# Patient Record
Sex: FEMALE | Race: WHITE | HISPANIC OR LATINO | Employment: PART TIME | URBAN - METROPOLITAN AREA
[De-identification: names, ages, dates, MRNs, and addresses within clinical notes are randomized per-mention and may not be internally consistent; named-entity substitution may affect disease eponyms.]

---

## 2025-03-11 ENCOUNTER — APPOINTMENT (OUTPATIENT)
Dept: RADIOLOGY | Facility: HOSPITAL | Age: 30
End: 2025-03-11
Payer: COMMERCIAL

## 2025-03-11 ENCOUNTER — APPOINTMENT (OUTPATIENT)
Dept: CARDIOLOGY | Facility: HOSPITAL | Age: 30
End: 2025-03-11
Payer: COMMERCIAL

## 2025-03-11 ENCOUNTER — HOSPITAL ENCOUNTER (EMERGENCY)
Facility: HOSPITAL | Age: 30
Discharge: HOME | End: 2025-03-11
Payer: COMMERCIAL

## 2025-03-11 VITALS
TEMPERATURE: 97 F | BODY MASS INDEX: 28.77 KG/M2 | WEIGHT: 179 LBS | OXYGEN SATURATION: 98 % | HEART RATE: 72 BPM | SYSTOLIC BLOOD PRESSURE: 122 MMHG | HEIGHT: 66 IN | DIASTOLIC BLOOD PRESSURE: 68 MMHG | RESPIRATION RATE: 18 BRPM

## 2025-03-11 DIAGNOSIS — R10.9 ABDOMINAL PAIN, UNSPECIFIED ABDOMINAL LOCATION: Primary | ICD-10-CM

## 2025-03-11 DIAGNOSIS — R11.2 NAUSEA AND VOMITING, UNSPECIFIED VOMITING TYPE: ICD-10-CM

## 2025-03-11 LAB
ALBUMIN SERPL BCP-MCNC: 4.4 G/DL (ref 3.4–5)
ALP SERPL-CCNC: 94 U/L (ref 33–110)
ALT SERPL W P-5'-P-CCNC: 17 U/L (ref 7–45)
ANION GAP SERPL CALC-SCNC: 16 MMOL/L (ref 10–20)
APPEARANCE UR: ABNORMAL
AST SERPL W P-5'-P-CCNC: 17 U/L (ref 9–39)
BASOPHILS # BLD AUTO: 0.06 X10*3/UL (ref 0–0.1)
BASOPHILS NFR BLD AUTO: 0.6 %
BILIRUB SERPL-MCNC: 0.6 MG/DL (ref 0–1.2)
BILIRUB UR STRIP.AUTO-MCNC: NEGATIVE MG/DL
BUN SERPL-MCNC: 5 MG/DL (ref 6–23)
CALCIUM SERPL-MCNC: 9.5 MG/DL (ref 8.6–10.3)
CARDIAC TROPONIN I PNL SERPL HS: <3 NG/L (ref 0–13)
CHLORIDE SERPL-SCNC: 105 MMOL/L (ref 98–107)
CO2 SERPL-SCNC: 23 MMOL/L (ref 21–32)
COLOR UR: YELLOW
CREAT SERPL-MCNC: 0.87 MG/DL (ref 0.5–1.05)
EGFRCR SERPLBLD CKD-EPI 2021: >90 ML/MIN/1.73M*2
EOSINOPHIL # BLD AUTO: 0.1 X10*3/UL (ref 0–0.7)
EOSINOPHIL NFR BLD AUTO: 1.1 %
ERYTHROCYTE [DISTWIDTH] IN BLOOD BY AUTOMATED COUNT: 15.5 % (ref 11.5–14.5)
FLUAV RNA RESP QL NAA+PROBE: NOT DETECTED
FLUBV RNA RESP QL NAA+PROBE: NOT DETECTED
GLUCOSE SERPL-MCNC: 90 MG/DL (ref 74–99)
GLUCOSE UR STRIP.AUTO-MCNC: NORMAL MG/DL
HCG UR QL IA.RAPID: NEGATIVE
HCT VFR BLD AUTO: 43.7 % (ref 36–46)
HGB BLD-MCNC: 14.2 G/DL (ref 12–16)
IMM GRANULOCYTES # BLD AUTO: 0.03 X10*3/UL (ref 0–0.7)
IMM GRANULOCYTES NFR BLD AUTO: 0.3 % (ref 0–0.9)
KETONES UR STRIP.AUTO-MCNC: ABNORMAL MG/DL
LEUKOCYTE ESTERASE UR QL STRIP.AUTO: ABNORMAL
LIPASE SERPL-CCNC: 25 U/L (ref 9–82)
LYMPHOCYTES # BLD AUTO: 2.18 X10*3/UL (ref 1.2–4.8)
LYMPHOCYTES NFR BLD AUTO: 23.2 %
MCH RBC QN AUTO: 26.3 PG (ref 26–34)
MCHC RBC AUTO-ENTMCNC: 32.5 G/DL (ref 32–36)
MCV RBC AUTO: 81 FL (ref 80–100)
MONOCYTES # BLD AUTO: 1.09 X10*3/UL (ref 0.1–1)
MONOCYTES NFR BLD AUTO: 11.6 %
NEUTROPHILS # BLD AUTO: 5.92 X10*3/UL (ref 1.2–7.7)
NEUTROPHILS NFR BLD AUTO: 63.2 %
NITRITE UR QL STRIP.AUTO: NEGATIVE
NRBC BLD-RTO: 0 /100 WBCS (ref 0–0)
PH UR STRIP.AUTO: 6.5 [PH]
PLATELET # BLD AUTO: 362 X10*3/UL (ref 150–450)
POTASSIUM SERPL-SCNC: 3.4 MMOL/L (ref 3.5–5.3)
PROT SERPL-MCNC: 7.5 G/DL (ref 6.4–8.2)
PROT UR STRIP.AUTO-MCNC: ABNORMAL MG/DL
RBC # BLD AUTO: 5.4 X10*6/UL (ref 4–5.2)
RBC # UR STRIP.AUTO: NEGATIVE MG/DL
RBC #/AREA URNS AUTO: ABNORMAL /HPF
RSV RNA RESP QL NAA+PROBE: NOT DETECTED
SARS-COV-2 RNA RESP QL NAA+PROBE: NOT DETECTED
SODIUM SERPL-SCNC: 141 MMOL/L (ref 136–145)
SP GR UR STRIP.AUTO: 1.03
UROBILINOGEN UR STRIP.AUTO-MCNC: ABNORMAL MG/DL
WBC # BLD AUTO: 9.4 X10*3/UL (ref 4.4–11.3)
WBC #/AREA URNS AUTO: ABNORMAL /HPF

## 2025-03-11 PROCEDURE — 96374 THER/PROPH/DIAG INJ IV PUSH: CPT | Mod: 59 | Performed by: PHYSICIAN ASSISTANT

## 2025-03-11 PROCEDURE — 71046 X-RAY EXAM CHEST 2 VIEWS: CPT | Mod: FY

## 2025-03-11 PROCEDURE — 83690 ASSAY OF LIPASE: CPT | Performed by: PHYSICIAN ASSISTANT

## 2025-03-11 PROCEDURE — 85025 COMPLETE CBC W/AUTO DIFF WBC: CPT | Performed by: PHYSICIAN ASSISTANT

## 2025-03-11 PROCEDURE — 80053 COMPREHEN METABOLIC PANEL: CPT | Performed by: PHYSICIAN ASSISTANT

## 2025-03-11 PROCEDURE — 2500000004 HC RX 250 GENERAL PHARMACY W/ HCPCS (ALT 636 FOR OP/ED): Performed by: PHYSICIAN ASSISTANT

## 2025-03-11 PROCEDURE — 96375 TX/PRO/DX INJ NEW DRUG ADDON: CPT | Performed by: PHYSICIAN ASSISTANT

## 2025-03-11 PROCEDURE — 81025 URINE PREGNANCY TEST: CPT | Performed by: PHYSICIAN ASSISTANT

## 2025-03-11 PROCEDURE — 36415 COLL VENOUS BLD VENIPUNCTURE: CPT | Performed by: PHYSICIAN ASSISTANT

## 2025-03-11 PROCEDURE — 74177 CT ABD & PELVIS W/CONTRAST: CPT

## 2025-03-11 PROCEDURE — 93005 ELECTROCARDIOGRAM TRACING: CPT

## 2025-03-11 PROCEDURE — 2500000001 HC RX 250 WO HCPCS SELF ADMINISTERED DRUGS (ALT 637 FOR MEDICARE OP): Performed by: PHYSICIAN ASSISTANT

## 2025-03-11 PROCEDURE — 2550000001 HC RX 255 CONTRASTS: Performed by: PHYSICIAN ASSISTANT

## 2025-03-11 PROCEDURE — 76856 US EXAM PELVIC COMPLETE: CPT

## 2025-03-11 PROCEDURE — 81001 URINALYSIS AUTO W/SCOPE: CPT | Performed by: PHYSICIAN ASSISTANT

## 2025-03-11 PROCEDURE — 96361 HYDRATE IV INFUSION ADD-ON: CPT | Performed by: PHYSICIAN ASSISTANT

## 2025-03-11 PROCEDURE — 87637 SARSCOV2&INF A&B&RSV AMP PRB: CPT | Performed by: PHYSICIAN ASSISTANT

## 2025-03-11 PROCEDURE — 71046 X-RAY EXAM CHEST 2 VIEWS: CPT | Mod: COMPUTED RADIOGRAPHY X-RAY | Performed by: RADIOLOGY

## 2025-03-11 PROCEDURE — 87086 URINE CULTURE/COLONY COUNT: CPT | Mod: STJLAB | Performed by: PHYSICIAN ASSISTANT

## 2025-03-11 PROCEDURE — 74177 CT ABD & PELVIS W/CONTRAST: CPT | Performed by: RADIOLOGY

## 2025-03-11 PROCEDURE — 96376 TX/PRO/DX INJ SAME DRUG ADON: CPT | Performed by: PHYSICIAN ASSISTANT

## 2025-03-11 PROCEDURE — 99285 EMERGENCY DEPT VISIT HI MDM: CPT | Mod: 25

## 2025-03-11 PROCEDURE — 84484 ASSAY OF TROPONIN QUANT: CPT | Performed by: PHYSICIAN ASSISTANT

## 2025-03-11 RX ORDER — ONDANSETRON HYDROCHLORIDE 2 MG/ML
4 INJECTION, SOLUTION INTRAVENOUS ONCE
Status: COMPLETED | OUTPATIENT
Start: 2025-03-11 | End: 2025-03-11

## 2025-03-11 RX ORDER — ACETAMINOPHEN 325 MG/1
975 TABLET ORAL ONCE
Status: COMPLETED | OUTPATIENT
Start: 2025-03-11 | End: 2025-03-11

## 2025-03-11 RX ORDER — ONDANSETRON 4 MG/1
4 TABLET, ORALLY DISINTEGRATING ORAL EVERY 8 HOURS PRN
Qty: 9 TABLET | Refills: 0 | Status: SHIPPED | OUTPATIENT
Start: 2025-03-11 | End: 2025-03-14

## 2025-03-11 RX ORDER — KETOROLAC TROMETHAMINE 15 MG/ML
15 INJECTION, SOLUTION INTRAMUSCULAR; INTRAVENOUS ONCE
Status: COMPLETED | OUTPATIENT
Start: 2025-03-11 | End: 2025-03-11

## 2025-03-11 RX ORDER — DICYCLOMINE HYDROCHLORIDE 10 MG/1
20 CAPSULE ORAL ONCE
Status: DISCONTINUED | OUTPATIENT
Start: 2025-03-11 | End: 2025-03-11 | Stop reason: HOSPADM

## 2025-03-11 RX ORDER — DICYCLOMINE HYDROCHLORIDE 20 MG/1
20 TABLET ORAL 3 TIMES DAILY
Qty: 15 TABLET | Refills: 0 | Status: SHIPPED | OUTPATIENT
Start: 2025-03-11 | End: 2025-03-16

## 2025-03-11 RX ADMIN — IOHEXOL 75 ML: 350 INJECTION, SOLUTION INTRAVENOUS at 13:52

## 2025-03-11 RX ADMIN — ACETAMINOPHEN 975 MG: 325 TABLET ORAL at 12:18

## 2025-03-11 RX ADMIN — ONDANSETRON 4 MG: 2 INJECTION INTRAMUSCULAR; INTRAVENOUS at 15:00

## 2025-03-11 RX ADMIN — SODIUM CHLORIDE, POTASSIUM CHLORIDE, SODIUM LACTATE AND CALCIUM CHLORIDE 1000 ML: 600; 310; 30; 20 INJECTION, SOLUTION INTRAVENOUS at 12:20

## 2025-03-11 RX ADMIN — ONDANSETRON 4 MG: 2 INJECTION INTRAMUSCULAR; INTRAVENOUS at 12:18

## 2025-03-11 RX ADMIN — KETOROLAC TROMETHAMINE 15 MG: 15 INJECTION, SOLUTION INTRAMUSCULAR; INTRAVENOUS at 13:06

## 2025-03-11 ASSESSMENT — COLUMBIA-SUICIDE SEVERITY RATING SCALE - C-SSRS
1. IN THE PAST MONTH, HAVE YOU WISHED YOU WERE DEAD OR WISHED YOU COULD GO TO SLEEP AND NOT WAKE UP?: NO
2. HAVE YOU ACTUALLY HAD ANY THOUGHTS OF KILLING YOURSELF?: NO
6. HAVE YOU EVER DONE ANYTHING, STARTED TO DO ANYTHING, OR PREPARED TO DO ANYTHING TO END YOUR LIFE?: NO

## 2025-03-11 ASSESSMENT — PAIN SCALES - GENERAL
PAINLEVEL_OUTOF10: 5 - MODERATE PAIN
PAINLEVEL_OUTOF10: 4

## 2025-03-11 ASSESSMENT — LIFESTYLE VARIABLES
EVER FELT BAD OR GUILTY ABOUT YOUR DRINKING: NO
HAVE YOU EVER FELT YOU SHOULD CUT DOWN ON YOUR DRINKING: NO
EVER HAD A DRINK FIRST THING IN THE MORNING TO STEADY YOUR NERVES TO GET RID OF A HANGOVER: NO
HAVE PEOPLE ANNOYED YOU BY CRITICIZING YOUR DRINKING: NO
TOTAL SCORE: 0

## 2025-03-11 ASSESSMENT — PAIN - FUNCTIONAL ASSESSMENT: PAIN_FUNCTIONAL_ASSESSMENT: 0-10

## 2025-03-11 ASSESSMENT — PAIN DESCRIPTION - DESCRIPTORS: DESCRIPTORS: CRAMPING

## 2025-03-11 ASSESSMENT — PAIN DESCRIPTION - LOCATION
LOCATION: ABDOMEN
LOCATION: ABDOMEN

## 2025-03-11 NOTE — DISCHARGE INSTRUCTIONS
Recommend starting daily MiraLAX or Metamucil.    Please return to the ER or seek immediate medical attention if you experience new or worsening abdominal pain, persistent vomiting, persistent diarrhea, black tar stools, fever of 38C (100.4) or higher, chest pain, shortness of breath, or worsening of your current symptoms.    You are welcome back any time. Thank you for entrusting your care to us, I hope we made your visit as pleasant as possible. Wishing you well!    Amanda Torres PA-C

## 2025-03-11 NOTE — Clinical Note
Xiomara Lynn was seen and treated in our emergency department on 3/11/2025.  She may return to work on 03/13/2025.       If you have any questions or concerns, please don't hesitate to call.      Amanda Torres PA-C

## 2025-03-11 NOTE — ED PROVIDER NOTES
Emergency Department Provider Note        History of Present Illness     History provided by: Patient and Parent  Limitations to History: None  External Records Reviewed with Brief Summary:  pmhx    HPI:  Xiomara Lynn is a 29 y.o. female who presents today for evaluation of abdominal pain nausea vomiting and diarrhea.  Patient states yesterday she started feeling sick, states that she started developing body aches, cough as well as nausea vomiting.  Patient states she has had about 10 episodes of nonbloody vomiting, states she is still having some chest pain that is worsened with vomiting.  Denies hematemesis.  She denies any fevers but does endorse feeling chilled since yesterday as well.  She believes she may have come down with the flu.  Patient states he is having some lower abdominal pain, states it migrates from the left side to the right side, she has had a previous benign liver tumor removal and they removed her gallbladder at the same time, otherwise denies abdominal surgeries.  She denies burning frequency urgency with urination, denies any vaginal discharge or concern for STDs.  She does endorse that she has been constipated and yesterday did an enema, states that she was able to get diarrheal stool out but she still feels like there is some in there.  She states she has had similar pain with constipation in the past.  Currently rates pain as 5 out of 10 in severity.    Physical Exam   Triage vitals:  T 36.1 °C (97 °F)  HR 86  BP (!) 137/100  RR 19  O2 99 % None (Room air)    Physical Exam  Vitals and nursing note reviewed.   Constitutional:       General: She is not in acute distress.     Appearance: Normal appearance. She is not toxic-appearing.   HENT:      Head: Normocephalic and atraumatic.      Nose: Nose normal.      Mouth/Throat:      Mouth: Mucous membranes are moist.   Eyes:      Extraocular Movements: Extraocular movements intact.   Cardiovascular:      Rate and Rhythm: Normal rate and  regular rhythm.   Pulmonary:      Effort: Pulmonary effort is normal.      Breath sounds: Normal breath sounds.   Chest:      Comments: No chest wall tenderness, no crepitus along the chest or neck area.  Abdominal:      Palpations: Abdomen is soft.      Tenderness: There is abdominal tenderness in the right lower quadrant, suprapubic area and left lower quadrant. There is right CVA tenderness. There is no rebound. Positive signs include McBurney's sign. Negative signs include Rovsing's sign, psoas sign and obturator sign.   Musculoskeletal:         General: Normal range of motion.      Cervical back: Normal range of motion and neck supple.   Skin:     General: Skin is warm and dry.   Neurological:      General: No focal deficit present.      Mental Status: She is alert.   Psychiatric:         Mood and Affect: Mood normal.         Thought Content: Thought content normal.        US PELVIS TRANSABDOMINAL WITH TRANSVAGINAL   Final Result   The endometrium is of normal thickness.        5 x 2 x 2 mm echogenic focus within the cervix the expected course of   the endometrium could reflect calcification or small polyp. The CT   findings of rounded hypodense endometrial lesion is not confirmed   with ultrasound. The CT findings could be related to some hemorrhage   in the endometrium or redundancy the cervix.        Normal right ovary.        Nonvisualization left ovary.             MACRO:   None        Signed by: Gil Celis 3/11/2025 4:31 PM   Dictation workstation:   [id number]      CT abdomen pelvis w IV contrast   Final Result   Status post partial hepatectomy and cholecystectomy.        3 mm nonobstructing calculus in lower pole the right kidney.        There is no evidence of appendicitis. The terminal ileum is within   normal limits.        Rounded area of heterogenous intermediate to low densities identified   in the lower uterine segment, possibly in the endometrial cavity.   This could be related to  blood product though underlying   space-occupying mass is to be excluded. Further workup with dedicated   pelvic ultrasound is recommended.             MACRO:   None        Signed by: Gil Celis 3/11/2025 2:15 PM   Dictation workstation:   [id number]      XR chest 2 views   Final Result   1.  No evidence of acute cardiopulmonary process.   2. No evidence of subdiaphragmatic free air.                  MACRO:   None.        Signed by: Donald Potter 3/11/2025 12:42 PM   Dictation workstation:   [id number]        Abnormal Labs Reviewed   CBC WITH AUTO DIFFERENTIAL - Abnormal; Notable for the following components:       Result Value    RBC 5.40 (*)     RDW 15.5 (*)     Monocytes Absolute 1.09 (*)     All other components within normal limits   COMPREHENSIVE METABOLIC PANEL - Abnormal; Notable for the following components:    Potassium 3.4 (*)     Urea Nitrogen 5 (*)     All other components within normal limits   URINALYSIS WITH REFLEX CULTURE AND MICROSCOPIC - Abnormal; Notable for the following components:    Appearance, Urine Turbid (*)     Protein, Urine 200 (2+) (*)     Ketones, Urine 100 (3+) (*)     Urobilinogen, Urine 4 (2+) (*)     Leukocyte Esterase, Urine 25 Yadi/uL (*)     All other components within normal limits   MICROSCOPIC ONLY, URINE - Abnormal; Notable for the following components:    WBC, Urine 11-20 (*)     RBC, Urine 6-10 (*)     All other components within normal limits     Labs Reviewed   CBC WITH AUTO DIFFERENTIAL - Abnormal       Result Value    WBC 9.4      nRBC 0.0      RBC 5.40 (*)     Hemoglobin 14.2      Hematocrit 43.7      MCV 81      MCH 26.3      MCHC 32.5      RDW 15.5 (*)     Platelets 362      Neutrophils % 63.2      Immature Granulocytes %, Automated 0.3      Lymphocytes % 23.2      Monocytes % 11.6      Eosinophils % 1.1      Basophils % 0.6      Neutrophils Absolute 5.92      Immature Granulocytes Absolute, Automated 0.03      Lymphocytes Absolute 2.18       Monocytes Absolute 1.09 (*)     Eosinophils Absolute 0.10      Basophils Absolute 0.06     COMPREHENSIVE METABOLIC PANEL - Abnormal    Glucose 90      Sodium 141      Potassium 3.4 (*)     Chloride 105      Bicarbonate 23      Anion Gap 16      Urea Nitrogen 5 (*)     Creatinine 0.87      eGFR >90      Calcium 9.5      Albumin 4.4      Alkaline Phosphatase 94      Total Protein 7.5      AST 17      Bilirubin, Total 0.6      ALT 17     URINALYSIS WITH REFLEX CULTURE AND MICROSCOPIC - Abnormal    Color, Urine Yellow      Appearance, Urine Turbid (*)     Specific Gravity, Urine 1.034      pH, Urine 6.5      Protein, Urine 200 (2+) (*)     Glucose, Urine Normal      Blood, Urine NEGATIVE      Ketones, Urine 100 (3+) (*)     Bilirubin, Urine NEGATIVE      Urobilinogen, Urine 4 (2+) (*)     Nitrite, Urine NEGATIVE      Leukocyte Esterase, Urine 25 Yadi/uL (*)    MICROSCOPIC ONLY, URINE - Abnormal    WBC, Urine 11-20 (*)     RBC, Urine 6-10 (*)    SARS-COV-2 AND INFLUENZA A/B PCR - Normal    Flu A Result Not Detected      Flu B Result Not Detected      Coronavirus 2019, PCR Not Detected      Narrative:     This assay is an FDA-cleared, in vitro diagnostic nucleic acid amplification test for the qualitative detection and differentiation of SARS CoV-2/ Influenza A/B from nasopharyngeal specimens collected from individuals with signs and symptoms of respiratory tract infections, and has been validated for use at [facility]. Negative results do not preclude COVID-19/ Influenza A/B infections and should not be used as the sole basis for diagnosis, treatment, or other management decisions. Testing for SARS CoV-2 is recommended only for patients who meet current clinical and/or epidemiological criteria defined by federal, state, or local public health directives.   RSV PCR - Normal    RSV PCR Not Detected      Narrative:     This assay is an FDA-cleared, in vitro diagnostic nucleic acid amplification test  for the detection of RSV from nasopharyngeal specimens, and has been validated for use at [facility]. Negative results do not preclude RSV infections, and should not be used as the sole basis for diagnosis, treatment, or other management decisions. If Influenza A/B and RSV PCR results are negative, testing for Parainfluenza virus, Adenovirus and Metapneumovirus is routinely performed for pediatric oncology and intensive care [place] [facility] and is available on other patients by placing an add-on request.       TROPONIN I, HIGH SENSITIVITY - Normal    Troponin I, High Sensitivity <3      Narrative:     Less than 99th percentile of normal range cutoff-  Female and children under 18 years old <14 ng/L; Male <21 ng/L: Negative  Repeat testing should be performed if clinically indicated.     Female and children under 18 years old 14-50 ng/L; Male 21-50 ng/L:  Consistent with possible cardiac damage and possible increased clinical   risk. Serial measurements may help to assess extent of myocardial damage.     >50 ng/L: Consistent with cardiac damage, increased clinical risk and  myocardial infarction. Serial measurements may help assess extent of   myocardial damage.      NOTE: Children less than 1 year old may have higher baseline troponin   levels and results should be interpreted in conjunction with the overall   clinical context.     NOTE: Troponin I testing is performed using a different   testing methodology at [facility] than at [facility]. Direct result comparisons should only   be made within the same method.   LIPASE - Normal    Lipase 25      Narrative:     Venipuncture immediately after or during the administration of Metamizole may lead to falsely low results. Testing should be performed immediately prior to Metamizole dosing.   HCG, URINE, QUALITATIVE - Normal    HCG, Urine NEGATIVE     URINE CULTURE   URINALYSIS WITH REFLEX CULTURE AND MICROSCOPIC     Narrative:     The following orders were created for panel order Urinalysis with Reflex Culture and Microscopic.  Procedure                               Abnormality         Status                     ---------                               -----------         ------                     Urinalysis with Reflex C...[[id number]]  Abnormal            Final result               Extra Urine Gray Tube[[id number]]                            In process                   Please view results for these tests on the individual orders.   EXTRA URINE GRAY TUBE     ED Course as of 25e Mar 11, 2025   1300 ECG 12 lead  EKG showed rate of 76, , , QTc 468, left axis deviation, T wave inversion in V1 and aVR as well as lead III, no ischemic changes, unchanged from 2017. []   1436 CT abdomen pelvis w IV contrast  Patient not having any vaginal bleeding and is not on her menstrual cycle currently and for this reason we are ordering a pelvic ultrasound. []   1438 Patient updated on plan of care and ultrasound results, discussed with her adding pelvic ultrasound, she is having improvement in her pain but not her nausea, Zofran did help initially but now it is coming back, will order additional Zofran. []   1639 Po challenge provided []   1640 Went over patient's test results, encouraged her to start taking MiraLAX and Metamucil daily, increase fiber as well as water intake, will trial Bentyl.  P.o. challenge was provided. []   1659 Patient tolerated p.o. challenge. []      ED Course User Index  [] Amanda Torres PA-C         Diagnoses as of 25   Abdominal pain, unspecified abdominal location   Nausea and vomiting, unspecified vomiting type         Medical Decision Making & ED Course   Medical Decision Makin y.o. female who presents today for evaluation of nausea vomiting diarrhea abdominal pain chest pain shortness of breath, myalgias and chills.  She does have diffuse  lower abdominal tenderness, specifically at the right lower quadrant, negative obturator and psoas sign.  Do have concerns for possible appendicitis, CT abdomen pelvis in addition to abdominal labs were ordered, given that patient was also having chest pain shortness of breath which I suspect is related to both vomiting and her cough that she has developed I did obtain a chest x-ray to rule out free air, pneumonia in addition to viral swabs.  She was given Zofran Tylenol Toradol and fluids initially, she declined any stronger pain medications.  Patient's EKG is unchanged from 2017, no acute ischemic changes,  urinalysis showed 25 leukocyte esterase with 11-20 white blood cells, no bacteria, she is not having any specific UTI symptoms, unlikely to be a true UTI, she is not pregnant, RSV flu and COVID are negative, troponin is negative, lipase within normal limits, CBC is without leukocytosis or anemia, CMP just showed a very mild hypokalemia at 3.4, CT abdomen pelvis showed:     IMPRESSION:  Status post partial hepatectomy and cholecystectomy.      3 mm nonobstructing calculus in lower pole the right kidney.      There is no evidence of appendicitis. The terminal ileum is within  normal limits.      Rounded area of heterogenous intermediate to low densities identified  in the lower uterine segment, possibly in the endometrial cavity.  This could be related to blood product though underlying  space-occupying mass is to be excluded. Further workup with dedicated  pelvic ultrasound is recommended.        Patient is not currently on her menstrual cycle and for this reason I did order also order an ultrasound      IMPRESSION:  The endometrium is of normal thickness.      5 x 2 x 2 mm echogenic focus within the cervix the expected course of  the endometrium could reflect calcification or small polyp. The CT  findings of rounded hypodense endometrial lesion is not confirmed  with ultrasound. The CT findings could be related  to some hemorrhage  in the endometrium or redundancy the cervix.      Normal right ovary.      Nonvisualization left ovary.    I notified patient of these findings, I do not suspect torsion or other acute or life-threatening pathology, patient did have some recurrence of pain at times while she was here but no overall worsening, she is able to tolerate p.o. challenge without difficulty.  I did notify her of return precautions including vomiting, inability to tolerate p.o., fevers, worsening pain etc. and she expressed understanding.  At this time with a unremarkable workup, feel the patient can safely be managed as an outpatient, she will be discharged with Zofran as well as Bentyl for her abdominal pain, she feels that this feels the constipation, I reviewed her CT and I do not see any obvious abdominal distention or significant stool burden however encouraged her to take MiraLAX or Metamucil daily to regulate her bowels and to increase water and fiber intake.  Patient expressed understanding with return precautions and is stable for discharge.  ----      Differential diagnoses considered include but are not limited to: Flu, COVID, gastroenteritis, appendicitis, pyelonephritis, etc.     Social Determinants of Health which Significantly Impact Care: None identified     EKG Independent Interpretation: EKG interpreted by myself. Please see ED Course for full interpretation.    Independent Result Review and Interpretation: Relevant laboratory and radiographic results were reviewed and independently interpreted by myself.  As necessary, they are commented on in the ED Course.    Chronic conditions affecting the patient's care: As documented above in [facility]    The patient was discussed with the following consultants/services: None    Care Considerations: As documented above in [facility]    ED Course:  ED Course as of 03/11/25 1956   Tue Mar 11, 2025   1300 ECG 12 lead  EKG showed rate of 76, , , QTc 468, left axis  deviation, T wave inversion in V1 and aVR as well as lead III, no ischemic changes, unchanged from March 27, 2017. []   1436 CT abdomen pelvis w IV contrast  Patient not having any vaginal bleeding and is not on her menstrual cycle currently and for this reason we are ordering a pelvic ultrasound. []   1438 Patient updated on plan of care and ultrasound results, discussed with her adding pelvic ultrasound, she is having improvement in her pain but not her nausea, Zofran did help initially but now it is coming back, will order additional Zofran. []   1639 Po challenge provided []   1640 Went over patient's test results, encouraged her to start taking MiraLAX and Metamucil daily, increase fiber as well as water intake, will trial Bentyl.  P.o. challenge was provided. []   1659 Patient tolerated p.o. challenge. []      ED Course User Index  [MC] Amanda Torres PA-C         Diagnoses as of 03/11/25 1956   Abdominal pain, unspecified abdominal location   Nausea and vomiting, unspecified vomiting type     Disposition   As a result of the work-up, the patient was discharged home.  she was informed of her diagnosis and instructed to come back with any concerns or worsening of condition.  she and was agreeable to the plan as discussed above.  she was given the opportunity to ask questions.  All of the patient's questions were answered.    Procedures   Procedures    Patient was seen independently    Amanda Torres PA-C  Emergency Medicine       Amanda Torres PA-C  03/11/25 1956

## 2025-03-12 LAB
BACTERIA UR CULT: NORMAL
HOLD SPECIMEN: NORMAL

## 2025-03-13 LAB
ATRIAL RATE: 76 BPM
P AXIS: 20 DEGREES
P OFFSET: 188 MS
P ONSET: 163 MS
PR INTERVAL: 112 MS
Q ONSET: 219 MS
QRS COUNT: 13 BEATS
QRS DURATION: 126 MS
QT INTERVAL: 416 MS
QTC CALCULATION(BAZETT): 468 MS
QTC FREDERICIA: 449 MS
R AXIS: -1 DEGREES
T AXIS: -2 DEGREES
T OFFSET: 427 MS
VENTRICULAR RATE: 76 BPM